# Patient Record
Sex: MALE | Race: WHITE | NOT HISPANIC OR LATINO | Employment: FULL TIME | RURAL
[De-identification: names, ages, dates, MRNs, and addresses within clinical notes are randomized per-mention and may not be internally consistent; named-entity substitution may affect disease eponyms.]

---

## 2019-12-04 ENCOUNTER — TRANSCRIBE ORDERS (OUTPATIENT)
Dept: PHYSICAL THERAPY | Facility: CLINIC | Age: 59
End: 2019-12-04

## 2019-12-04 DIAGNOSIS — M25.559 ARTHRALGIA OF HIP, UNSPECIFIED LATERALITY: ICD-10-CM

## 2019-12-04 DIAGNOSIS — Z96.642 STATUS POST TOTAL REPLACEMENT OF LEFT HIP: Primary | ICD-10-CM

## 2019-12-10 ENCOUNTER — TREATMENT (OUTPATIENT)
Dept: PHYSICAL THERAPY | Facility: CLINIC | Age: 59
End: 2019-12-10

## 2019-12-10 DIAGNOSIS — M25.552 PAIN OF LEFT HIP JOINT: ICD-10-CM

## 2019-12-10 DIAGNOSIS — Z96.642 PRESENCE OF LEFT ARTIFICIAL HIP JOINT: Primary | ICD-10-CM

## 2019-12-10 PROCEDURE — 97161 PT EVAL LOW COMPLEX 20 MIN: CPT | Performed by: PHYSICAL THERAPIST

## 2019-12-10 PROCEDURE — 97140 MANUAL THERAPY 1/> REGIONS: CPT | Performed by: PHYSICAL THERAPIST

## 2019-12-10 PROCEDURE — 97110 THERAPEUTIC EXERCISES: CPT | Performed by: PHYSICAL THERAPIST

## 2019-12-10 NOTE — PROGRESS NOTES
Physical Therapy Initial Evaluation and Plan of Care    Patient: John Montes   : 1960  Diagnosis/ICD-10 Code:  Presence of left artificial hip joint [Z96.642]  Referring practitioner: Jared Jama MD  Date of Initial Visit: 12/10/2019  Today's Date: 12/10/2019  Patient seen for 1 sessions           Subjective Questionnaire: [place] hip 12% deficit  Left THR 4 weeks ago today, home health with [facility] 3 weeks.  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  Mild lateral hip discomfort with prolonged activity.  Overall very pleased      Subjective Evaluation    History of Present Illness  Date of surgery: 11/10/2019      Patient Occupation: EMT Quality of life: good    Pain  Current pain ratin  Quality: dull ache and discomfort  Relieving factors: rest  Aggravating factors: ambulation, squatting, stairs and movement  Progression: improved    Treatments  Previous treatment: home therapy  Discharged from (in last 30 days): home health care  Patient Goals  Patient goals for therapy: decreased pain, improved balance, increased motion, increased strength, return to work and return to sport/leisure activities             Objective       Active Range of Motion     Additional Active Range of Motion Details  Hip flexion to 95 degrees, abduction active to be assessed.  Hip extension 10 degrees past neutral in sidelying.      Strength/Myotome Testing     Additional Strength Details  All hip strength left generally 4/5, more deconditioned than weakness, needs cues to avoid + Trendelenburg with gait.  Can perform figure 4 cross over without pain    Ambulation   Weight-Bearing Status     Additional Weight-Bearing Status Details  FWB with fair push off, mild lateral pelvic drop with gait improved with verbal cues.  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  Patient will benefit from PT for use of modalities as needed, active and passive stretching and strength endurance work including functional training for preparation for return to work.  Treatment will include HEP  Prognosis: good  Functional Limitations: carrying objects, lifting, walking, pulling, pushing, standing and unable to perform repetitive tasks  Goals  Plan Goals: Initiate ROM and strength program in 1 week    Independent HEP for self management by discharge    IADL/work  to prior level of function by discharge    [place] hip Subjective questionnaire will be to 10% or less by discharge      Plan  Therapy options: will be seen for skilled physical therapy services  Planned modality interventions: thermotherapy (hydrocollator packs)  Planned therapy interventions: ADL retraining, joint mobilization, manual therapy, strengthening and stretching  Treatment plan discussed with: patient  Plan details: 2-3X/week X 4 weeks to reach above goals        Timed:         Manual Therapy:  10    mins  54446;     Therapeutic Exercise:    30     mins  24690;     Neuromuscular Ghislaine:    0    mins  49843;    Therapeutic Activity:     0     mins  65883;     Gait Trainin     mins  79274;     Ultrasound:     0     mins  40882;    Ionto                               0    mins   93632    Un-Timed:  Electrical Stimulation:    0     mins  85853 (MC );  Dry Needling     0     mins self-pay  Traction     0     mins 44194  Low Eval     15     Mins  35198  Mod Eval     0     Mins  37527  High Eval                       0     Mins  67772        Timed Treatment:   40   mins   Total Treatment:     55    mins    PT SIGNATURE: Adela Seaman, PT   DATE TREATMENT INITIATED: 12/10/2019    Initial Certification  Certification Period: 3/9/2020  I certify that the therapy services are furnished while this patient is under my care.  The services outlined above are required by this patient, and will be reviewed every 90 days.     PHYSICIAN: Jared Jama MD      DATE:     Please sign and return via fax to [phone].. Thank you, [facility].

## 2019-12-12 ENCOUNTER — TREATMENT (OUTPATIENT)
Dept: PHYSICAL THERAPY | Facility: CLINIC | Age: 59
End: 2019-12-12

## 2019-12-12 DIAGNOSIS — Z96.642 PRESENCE OF LEFT ARTIFICIAL HIP JOINT: Primary | ICD-10-CM

## 2019-12-12 DIAGNOSIS — M25.552 PAIN OF LEFT HIP JOINT: ICD-10-CM

## 2019-12-12 PROCEDURE — 97110 THERAPEUTIC EXERCISES: CPT | Performed by: PHYSICAL THERAPIST

## 2019-12-12 PROCEDURE — 97140 MANUAL THERAPY 1/> REGIONS: CPT | Performed by: PHYSICAL THERAPIST

## 2019-12-12 NOTE — PROGRESS NOTES
Physical Therapy Daily Progress Note      Patient: John Montes   : 1960  Diagnosis/ICD-10 Code:  Presence of left artificial hip joint [Z96.642]  Referring practitioner: Jared Jama MD  Date of Initial Visit: Type: THERAPY  Noted: 12/10/2019  Today's Date: 2019  Patient seen for 2 sessions         John Montes reports: he has no pain today stating he has been able to do about anything he wants but still struggles to kneel in his shop or get low when working on his car.    Objective   See Exercise, Manual, and Modality Logs for complete treatment.     Assessment/Plan   Pt. Tolerates treatment well today with no pain with exercise or stretch. Pt. Continues to improve ambulation and walks with less antalgic gait this visit.    Progress per Plan of Care           Timed:         Manual Therapy:    8     mins  23598;     Therapeutic Exercise:    22     mins  14155;     Neuromuscular Ghislaine:        mins  20843;    Therapeutic Activity:          mins  61067;     Gait Training:           mins  92263;     Ultrasound:          mins  23714;    Ionto                                   mins   01306  Self Care                            mins   64317  Canalith Repos                   mins  4209    Un-Timed:  Electrical Stimulation:         mins  10014 ( );  Dry Needling          mins self-pay  Traction          mins 06802  Low Eval          Mins  33510  Mod Eval          Mins  54732  High Eval                            Mins  05114    Timed Treatment:   30   mins   Total Treatment:     30   mins    Belem De Leon PTA  Physical Therapist Assistant License #[id number]

## 2019-12-16 ENCOUNTER — TREATMENT (OUTPATIENT)
Dept: PHYSICAL THERAPY | Facility: CLINIC | Age: 59
End: 2019-12-16

## 2019-12-16 DIAGNOSIS — Z96.642 PRESENCE OF LEFT ARTIFICIAL HIP JOINT: Primary | ICD-10-CM

## 2019-12-16 DIAGNOSIS — M25.552 PAIN OF LEFT HIP JOINT: ICD-10-CM

## 2019-12-16 PROCEDURE — 97110 THERAPEUTIC EXERCISES: CPT | Performed by: PHYSICAL THERAPIST

## 2019-12-16 PROCEDURE — 97140 MANUAL THERAPY 1/> REGIONS: CPT | Performed by: PHYSICAL THERAPIST

## 2019-12-16 NOTE — PROGRESS NOTES
Physical Therapy Daily Progress Note        Patient: John Montes   : 1960  Diagnosis/ICD-10 Code:  Presence of left artificial hip joint [Z96.642]  Referring practitioner: Jared Jama MD  Date of Initial Visit: Type: THERAPY  Noted: 12/10/2019  Today's Date: 2019  Patient seen for 3 sessions         John Montes reports: trying to be more active at home in preparation for RTW, some stiffness but pleased with progress      Subjective     Objective   See Exercise, Manual, and Modality Logs for complete treatment.   Added deep squat motion on leg press and kettle bell lift    Trial HP and stretch prior to PT ex      Assessment/Plan    Better gait, less sidesway           Timed:         Manual Therapy:    15     mins  51889;     Therapeutic Exercise:    30     mins  80599;     Neuromuscular Ghislaine:    0    mins  84417;    Therapeutic Activity:     0     mins  06049;     Gait Trainin     mins  29311;     Ultrasound:     0     mins  40756;    Ionto                               0    mins   89630  Self Care                       0     mins   04434  Canalith Repos               0    mins  4209    Un-Timed:  Electrical Stimulation:    0     mins  50795 ( );  Dry Needling     0     mins self-pay  Traction     0     mins 91253  Low Eval     0     Mins  06115  Mod Eval     0     Mins  77343  High Eval                       0     Mins  51463    Timed Treatment:   45   mins   Total Treatment:     45   mins    Adela Seaman PT  Physical Therapist  [place] license [id number]

## 2019-12-18 ENCOUNTER — TREATMENT (OUTPATIENT)
Dept: PHYSICAL THERAPY | Facility: CLINIC | Age: 59
End: 2019-12-18

## 2019-12-18 DIAGNOSIS — M25.552 PAIN OF LEFT HIP JOINT: ICD-10-CM

## 2019-12-18 DIAGNOSIS — Z96.642 PRESENCE OF LEFT ARTIFICIAL HIP JOINT: Primary | ICD-10-CM

## 2019-12-18 PROCEDURE — 97110 THERAPEUTIC EXERCISES: CPT | Performed by: PHYSICAL THERAPIST

## 2019-12-18 PROCEDURE — 97140 MANUAL THERAPY 1/> REGIONS: CPT | Performed by: PHYSICAL THERAPIST

## 2019-12-18 NOTE — PROGRESS NOTES
Physical Therapy Daily Progress Note        Patient: John Montes   : 1960  Diagnosis/ICD-10 Code:  Presence of left artificial hip joint [Z96.642]  Referring practitioner: Jared Jama MD  Date of Initial Visit: Type: THERAPY  Noted: 12/10/2019  Today's Date: 2019  Patient seen for 4 sessions         John Montes reports: no new complaints, OK with increasing ex levels      Subjective     Objective   See Exercise, Manual, and Modality Logs for complete treatment.   Added lift box for squat lift requirement, sidestep TM on incline for hip abd strength, seated elliptical reverse      Assessment/Plan    Gaining mobility and strength, better gait           Timed:         Manual Therapy:    15     mins  03114;     Therapeutic Exercise:    30     mins  13009;     Neuromuscular Ghislaine:    0    mins  14534;    Therapeutic Activity:     0     mins  03465;     Gait Trainin     mins  21972;     Ultrasound:     0     mins  69070;    Ionto                               0    mins   95706  Self Care                       0     mins   83432  Canalith Repos               0    mins  4209    Un-Timed:  Electrical Stimulation:    0     mins  65504 ( );  Dry Needling     0     mins self-pay  Traction     0     mins 60451  Low Eval     0     Mins  87115  Mod Eval     0     Mins  59559  High Eval                       0     Mins  99966    Timed Treatment:   45   mins   Total Treatment:     45   mins    Adela Seaman PT  Physical Therapist  [place] license [id number]

## 2019-12-20 ENCOUNTER — TREATMENT (OUTPATIENT)
Dept: PHYSICAL THERAPY | Facility: CLINIC | Age: 59
End: 2019-12-20

## 2019-12-20 DIAGNOSIS — M25.552 PAIN OF LEFT HIP JOINT: ICD-10-CM

## 2019-12-20 DIAGNOSIS — Z96.642 PRESENCE OF LEFT ARTIFICIAL HIP JOINT: Primary | ICD-10-CM

## 2019-12-20 PROCEDURE — 97110 THERAPEUTIC EXERCISES: CPT | Performed by: PHYSICAL THERAPIST

## 2019-12-20 PROCEDURE — 97140 MANUAL THERAPY 1/> REGIONS: CPT | Performed by: PHYSICAL THERAPIST

## 2019-12-20 NOTE — PROGRESS NOTES
Physical Therapy Daily Progress Note      Patient: John Montes   : 1960  Diagnosis/ICD-10 Code:  Presence of left artificial hip joint [Z96.642]  Referring practitioner: Jared Jama MD  Date of Initial Visit: Type: THERAPY  Noted: 12/10/2019  Today's Date: 2019  Patient seen for 5 sessions         John Montes reports: he is having secondary issue of low back pain today stating he tweaked his back when working on his car last night. Pt. States the back pain is only present when he is walking. Pt. States the hip has still been doing good.    Objective   See Exercise, Manual, and Modality Logs for complete treatment.     Assessment/Plan   Pt. Tolerates treatment well this visit reporting no pain with exercise and reports relief of some of his back pain with exercise today. Pt. Continues to improve with therapy show decreased hip drop and greater LE strength. Pt. Is tolerating deeper squat for lifiting exercises at this time.    Progress per Plan of Care           Timed:         Manual Therapy:    8     mins  21456;     Therapeutic Exercise:    22     mins  80654;     Neuromuscular Ghislaine:        mins  95231;    Therapeutic Activity:          mins  80570;     Gait Training:           mins  30327;     Ultrasound:          mins  20662;    Ionto                                   mins   92283  Self Care                            mins   41217  Canalith Repos                   mins  4209    Un-Timed:  Electrical Stimulation:         mins  06852 ( );  Dry Needling          mins self-pay  Traction          mins 78983  Low Eval          Mins  96616  Mod Eval          Mins  46609  High Eval                            Mins  16641    Timed Treatment:   30   mins   Total Treatment:     40   mins    Belem De Leon PTA  Physical Therapist Assistant License #[id number]

## 2019-12-23 ENCOUNTER — TREATMENT (OUTPATIENT)
Dept: PHYSICAL THERAPY | Facility: CLINIC | Age: 59
End: 2019-12-23

## 2019-12-23 DIAGNOSIS — Z96.642 PRESENCE OF LEFT ARTIFICIAL HIP JOINT: Primary | ICD-10-CM

## 2019-12-23 DIAGNOSIS — M25.552 PAIN OF LEFT HIP JOINT: ICD-10-CM

## 2019-12-23 PROCEDURE — 97110 THERAPEUTIC EXERCISES: CPT | Performed by: PHYSICAL THERAPIST

## 2019-12-23 PROCEDURE — 97140 MANUAL THERAPY 1/> REGIONS: CPT | Performed by: PHYSICAL THERAPIST

## 2019-12-23 NOTE — PROGRESS NOTES
Physical Therapy Daily Progress Note        Patient: John Montes   : 1960  Diagnosis/ICD-10 Code:  Presence of left artificial hip joint [Z96.642]  Referring practitioner: Jared Jama MD  Date of Initial Visit: Type: THERAPY  Noted: 12/10/2019  Today's Date: 2019  Patient seen for 6 sessions         John Montes reports: no new reports, pleased with increasing mobility      Subjective     Objective   See Exercise, Manual, and Modality Logs for complete treatment.   Increased squat motion, trial of lift in left shoe, stands in slight side shift to left and ? Leg length in long sit, patient given precautions to avoid any pain with trial      Assessment/Plan    Making excellent gains with ROM and strength           Timed:         Manual Therapy:    15     mins  09456;     Therapeutic Exercise:    30     mins  51691;     Neuromuscular Ghislaine:    0    mins  41166;    Therapeutic Activity:     0     mins  14943;     Gait Trainin     mins  68067;     Ultrasound:     0     mins  92258;    Ionto                               0    mins   07263  Self Care                       0     mins   22947  Canalith Repos               0    mins  4209    Un-Timed:  Electrical Stimulation:    0     mins  92988 ( );  Dry Needling     0     mins self-pay  Traction     0     mins 05198  Low Eval     0     Mins  77608  Mod Eval     0     Mins  62766  High Eval                       0     Mins  88931    Timed Treatment:   45   mins   Total Treatment:     45   mins    Adela Seaman PT  Physical Therapist  [place] license [id number]

## 2019-12-27 ENCOUNTER — TREATMENT (OUTPATIENT)
Dept: PHYSICAL THERAPY | Facility: CLINIC | Age: 59
End: 2019-12-27

## 2019-12-27 DIAGNOSIS — M25.552 PAIN OF LEFT HIP JOINT: ICD-10-CM

## 2019-12-27 DIAGNOSIS — Z96.642 PRESENCE OF LEFT ARTIFICIAL HIP JOINT: Primary | ICD-10-CM

## 2019-12-27 PROCEDURE — 97140 MANUAL THERAPY 1/> REGIONS: CPT | Performed by: PHYSICAL THERAPIST

## 2019-12-27 PROCEDURE — 97110 THERAPEUTIC EXERCISES: CPT | Performed by: PHYSICAL THERAPIST

## 2019-12-27 NOTE — PROGRESS NOTES
Physical Therapy Daily Progress Note      Patient: John Montes   : 1960  Diagnosis/ICD-10 Code:  Presence of left artificial hip joint [Z96.642]  Referring practitioner: Jared Jama MD  Date of Initial Visit: Type: THERAPY  Noted: 12/10/2019  Today's Date: 2019  Patient seen for 7 sessions         John Montes reports: he has still been feeling good and has actually been better than he has been the entire time the last few days.    Objective   See Exercise, Manual, and Modality Logs for complete treatment.     Assessment/Plan  Pt. Continues to improve tolerance to activity and shows greater depth into squat without pain or tightness. Pt. Has improved ability to walk without antalgic gait and without compensation.    Progress per Plan of Care           Timed:         Manual Therapy:    8     mins  45841;     Therapeutic Exercise:    22     mins  17801;     Neuromuscular Ghislaine:        mins  50022;    Therapeutic Activity:          mins  78855;     Gait Training:           mins  15287;     Ultrasound:          mins  46978;    Ionto                                   mins   44437  Self Care                            mins   13092  Canalith Repos                   mins  4209    Un-Timed:  Electrical Stimulation:         mins  70101 ( );  Dry Needling          mins self-pay  Traction          mins 99699  Low Eval          Mins  67024  Mod Eval          Mins  65207  High Eval                            Mins  62514    Timed Treatment:   30   mins   Total Treatment:     40   mins    Belem De Leon PTA  Physical Therapist Assistant License #[id number]

## 2019-12-30 ENCOUNTER — TREATMENT (OUTPATIENT)
Dept: PHYSICAL THERAPY | Facility: CLINIC | Age: 59
End: 2019-12-30

## 2019-12-30 DIAGNOSIS — M25.552 PAIN OF LEFT HIP JOINT: ICD-10-CM

## 2019-12-30 DIAGNOSIS — Z96.642 PRESENCE OF LEFT ARTIFICIAL HIP JOINT: Primary | ICD-10-CM

## 2019-12-30 PROCEDURE — 97110 THERAPEUTIC EXERCISES: CPT | Performed by: PHYSICAL THERAPIST

## 2019-12-30 PROCEDURE — 97140 MANUAL THERAPY 1/> REGIONS: CPT | Performed by: PHYSICAL THERAPIST

## 2019-12-30 NOTE — PROGRESS NOTES
Physical Therapy Daily Progress Note        Patient: John Montes   : 1960  Diagnosis/ICD-10 Code:  Presence of left artificial hip joint [Z96.642]  Referring practitioner: Jared Jama MD  Date of Initial Visit: Type: THERAPY  Noted: 12/10/2019  Today's Date: 2019  Patient seen for 8 sessions         John Montes reports: did a volunteer run and was able to lift a guerney, pleased with progress      Subjective     Objective   See Exercise, Manual, and Modality Logs for complete treatment.   Completes all flow chart without any issues      Assessment/Plan    Approaching goals           Timed:         Manual Therapy:    15     mins  23835;     Therapeutic Exercise:    25     mins  68006;     Neuromuscular Ghislaine:    0    mins  63365;    Therapeutic Activity:     0     mins  67443;     Gait Trainin     mins  77110;     Ultrasound:     0     mins  92292;    Ionto                               0    mins   80118  Self Care                       0     mins   13046  Canalith Repos               0    mins  4209    Un-Timed:  Electrical Stimulation:    0     mins  18332 ( );  Dry Needling     0     mins self-pay  Traction     0     mins 33436  Low Eval     0     Mins  75364  Mod Eval     0     Mins  14049  High Eval                       0     Mins  68691    Timed Treatment:   40   mins   Total Treatment:     40   mins    Adela Seaman PT  Physical Therapist  [place] license [id number]

## 2020-01-08 ENCOUNTER — TREATMENT (OUTPATIENT)
Dept: PHYSICAL THERAPY | Facility: CLINIC | Age: 60
End: 2020-01-08

## 2020-01-08 DIAGNOSIS — M25.552 PAIN OF LEFT HIP JOINT: ICD-10-CM

## 2020-01-08 DIAGNOSIS — Z96.642 PRESENCE OF LEFT ARTIFICIAL HIP JOINT: Primary | ICD-10-CM

## 2020-01-08 PROCEDURE — 97110 THERAPEUTIC EXERCISES: CPT | Performed by: PHYSICAL THERAPIST

## 2020-01-08 NOTE — PROGRESS NOTES
Physical Therapy Daily Progress Note/ DC summary        Patient: John Montes   : 1960  Diagnosis/ICD-10 Code:  Presence of left artificial hip joint [Z96.642]  Referring practitioner: Jared Jama MD  Date of Initial Visit: Type: THERAPY  Noted: 12/10/2019  Today's Date: 2020  Patient seen for 9 sessions         John Montes reports: no issues, no pain, feels he is ready to return to job      Subjective     Objective   See Exercise, Manual, and Modality Logs for complete treatment.   Resisted testing all planes WNL and ROM WNL, able to deep squat as needed for lifting for work duties      Assessment/Plan    All goals met as per plan of care, patient indep with HEP, ready for DC to [facility], advised to call if any issues           Timed:         Manual Therapy:    5     mins  48812;     Therapeutic Exercise:    30     mins  91495;     Neuromuscular Ghislaine:    0    mins  63283;    Therapeutic Activity:     0     mins  56057;     Gait Trainin     mins  31109;     Ultrasound:     0     mins  62713;    Ionto                               0    mins   46464  Self Care                       0     mins   76766  Canalith Repos               0    mins  4209    Un-Timed:  Electrical Stimulation:    0     mins  80029 ( );  Dry Needling     0     mins self-pay  Traction     0     mins 77833  Low Eval     0     Mins  39312  Mod Eval     0     Mins  05021  High Eval                       0     Mins  02406    Timed Treatment:   35   mins   Total Treatment:     35   mins    Adela Seaman PT  Physical Therapist  [place] license [id number]

## 2021-08-30 ENCOUNTER — TRANSCRIBE ORDERS (OUTPATIENT)
Dept: ADMINISTRATIVE | Facility: HOSPITAL | Age: 61
End: 2021-08-30

## 2021-08-30 DIAGNOSIS — M25.512 LEFT SHOULDER PAIN, UNSPECIFIED CHRONICITY: Primary | ICD-10-CM

## 2021-09-22 ENCOUNTER — HOSPITAL ENCOUNTER (OUTPATIENT)
Dept: MRI IMAGING | Facility: HOSPITAL | Age: 61
Discharge: HOME | End: 2021-09-22
Admitting: ORTHOPAEDIC SURGERY

## 2021-09-22 DIAGNOSIS — M25.512 LEFT SHOULDER PAIN, UNSPECIFIED CHRONICITY: ICD-10-CM

## 2021-09-22 PROCEDURE — 73221 MRI JOINT UPR EXTREM W/O DYE: CPT

## 2021-10-19 ENCOUNTER — HOSPITAL ENCOUNTER (OUTPATIENT)
Dept: CARDIOLOGY | Facility: HOSPITAL | Age: 61
Discharge: HOME | End: 2021-10-19

## 2021-10-19 ENCOUNTER — LAB (OUTPATIENT)
Dept: LAB | Facility: HOSPITAL | Age: 61
End: 2021-10-19

## 2021-10-19 ENCOUNTER — TRANSCRIBE ORDERS (OUTPATIENT)
Dept: ADMINISTRATIVE | Facility: HOSPITAL | Age: 61
End: 2021-10-19

## 2021-10-19 DIAGNOSIS — Z01.818 PRE-OP TESTING: Primary | ICD-10-CM

## 2021-10-19 DIAGNOSIS — Z01.818 PRE-OP TESTING: ICD-10-CM

## 2021-10-19 LAB
ALBUMIN SERPL-MCNC: 4.1 G/DL (ref 3.5–5.2)
ALBUMIN/GLOB SERPL: 1.5 G/DL
ALP SERPL-CCNC: 92 U/L (ref 39–117)
ALT SERPL W P-5'-P-CCNC: 30 U/L (ref 1–41)
ANION GAP SERPL CALCULATED.3IONS-SCNC: 9.3 MMOL/L (ref 5–15)
AST SERPL-CCNC: 22 U/L (ref 1–40)
BASOPHILS # BLD AUTO: 0.06 10*3/MM3 (ref 0–0.2)
BASOPHILS NFR BLD AUTO: 0.8 % (ref 0–1.5)
BILIRUB SERPL-MCNC: 0.7 MG/DL (ref 0–1.2)
BUN SERPL-MCNC: 27 MG/DL (ref 8–23)
BUN/CREAT SERPL: 34.6 (ref 7–25)
CALCIUM SPEC-SCNC: 9.4 MG/DL (ref 8.6–10.5)
CHLORIDE SERPL-SCNC: 106 MMOL/L (ref 98–107)
CO2 SERPL-SCNC: 24.7 MMOL/L (ref 22–29)
CREAT SERPL-MCNC: 0.78 MG/DL (ref 0.76–1.27)
DEPRECATED RDW RBC AUTO: 42.5 FL (ref 37–54)
EOSINOPHIL # BLD AUTO: 0.13 10*3/MM3 (ref 0–0.4)
EOSINOPHIL NFR BLD AUTO: 1.8 % (ref 0.3–6.2)
ERYTHROCYTE [DISTWIDTH] IN BLOOD BY AUTOMATED COUNT: 12.8 % (ref 12.3–15.4)
GFR SERPL CREATININE-BSD FRML MDRD: 101 ML/MIN/1.73
GLOBULIN UR ELPH-MCNC: 2.8 GM/DL
GLUCOSE SERPL-MCNC: 155 MG/DL (ref 65–99)
HCT VFR BLD AUTO: 47.2 % (ref 37.5–51)
HGB BLD-MCNC: 15.6 G/DL (ref 13–17.7)
IMM GRANULOCYTES # BLD AUTO: 0.03 10*3/MM3 (ref 0–0.05)
IMM GRANULOCYTES NFR BLD AUTO: 0.4 % (ref 0–0.5)
LYMPHOCYTES # BLD AUTO: 1.49 10*3/MM3 (ref 0.7–3.1)
LYMPHOCYTES NFR BLD AUTO: 20.3 % (ref 19.6–45.3)
MCH RBC QN AUTO: 29.8 PG (ref 26.6–33)
MCHC RBC AUTO-ENTMCNC: 33.1 G/DL (ref 31.5–35.7)
MCV RBC AUTO: 90.2 FL (ref 79–97)
MONOCYTES # BLD AUTO: 0.64 10*3/MM3 (ref 0.1–0.9)
MONOCYTES NFR BLD AUTO: 8.7 % (ref 5–12)
NEUTROPHILS NFR BLD AUTO: 4.98 10*3/MM3 (ref 1.7–7)
NEUTROPHILS NFR BLD AUTO: 68 % (ref 42.7–76)
NRBC BLD AUTO-RTO: 0 /100 WBC (ref 0–0.2)
PLATELET # BLD AUTO: 224 10*3/MM3 (ref 140–450)
PMV BLD AUTO: 10.1 FL (ref 6–12)
POTASSIUM SERPL-SCNC: 4.1 MMOL/L (ref 3.5–5.2)
PROT SERPL-MCNC: 6.9 G/DL (ref 6–8.5)
RBC # BLD AUTO: 5.23 10*6/MM3 (ref 4.14–5.8)
SODIUM SERPL-SCNC: 140 MMOL/L (ref 136–145)
WBC # BLD AUTO: 7.33 10*3/MM3 (ref 3.4–10.8)

## 2021-10-19 PROCEDURE — 93005 ELECTROCARDIOGRAM TRACING: CPT | Performed by: ORTHOPAEDIC SURGERY

## 2021-10-19 PROCEDURE — 80053 COMPREHEN METABOLIC PANEL: CPT

## 2021-10-19 PROCEDURE — 36415 COLL VENOUS BLD VENIPUNCTURE: CPT

## 2021-10-19 PROCEDURE — 85025 COMPLETE CBC W/AUTO DIFF WBC: CPT

## 2021-10-19 PROCEDURE — 93010 ELECTROCARDIOGRAM REPORT: CPT | Performed by: INTERNAL MEDICINE

## 2021-10-20 LAB — QT INTERVAL: 409 MS

## 2021-11-15 ENCOUNTER — TREATMENT (OUTPATIENT)
Dept: PHYSICAL THERAPY | Facility: CLINIC | Age: 61
End: 2021-11-15

## 2021-11-15 DIAGNOSIS — Z98.890 HISTORY OF ARTHROSCOPY OF LEFT SHOULDER: ICD-10-CM

## 2021-11-15 DIAGNOSIS — R29.898 SHOULDER WEAKNESS: Primary | ICD-10-CM

## 2021-11-15 PROCEDURE — 97161 PT EVAL LOW COMPLEX 20 MIN: CPT | Performed by: PHYSICAL THERAPIST

## 2021-11-15 PROCEDURE — 97140 MANUAL THERAPY 1/> REGIONS: CPT | Performed by: PHYSICAL THERAPIST

## 2021-11-15 PROCEDURE — 97110 THERAPEUTIC EXERCISES: CPT | Performed by: PHYSICAL THERAPIST

## 2021-11-15 NOTE — PROGRESS NOTES
Physical Therapy Initial Evaluation and Plan of Care    Patient: John Montes   : 1960  Diagnosis/ICD-10 Code:  Shoulder weakness [R29.898]  Referring practitioner: SAMIR Hughes  Date of Initial Visit: 11/15/2021  Today's Date: 11/15/2021  Patient seen for 1 sessions             Subjective Evaluation    History of Present Illness  Mechanism of injury: Patient is a 61 y.o. WM who presents s/p L shoulder scope on 10/26/21.  He dislikes sling and doesn't know how long MD wants him to keep wearing it.  He did not bring a protocol with him so we have requested that from MD. He reports his rotator cuff was repaired, removed a bone spur and cleaned it up.  No significant pain (1/10), just stiffness.  He is sleeping in bed, mostly bothered by sling. Personal goals:  get out of sling, get back to work at [email or web address].    Patient Goals  Patient goals for therapy: increased motion, increased strength and return to work             Objective QuickDASH indicates 70.5% impairment with limitations in most activities.  AAROM L shoulder flexion = 120 deg, abd to 90 deg, IR to belly, ER to 20 deg.  Not wearing sling today. MMT deferred due to surgical precautions.      Assessment & Plan     Assessment  Impairments: abnormal muscle tone, abnormal or restricted ROM, activity intolerance and lacks appropriate home exercise program  Functional Limitations: carrying objects, lifting, reaching behind back and reaching overhead  Goals  Plan Goals: Patient independent with HEP in 2 weeks  Flexion to 140 deg in 2 weeks  Able to return to driving in 2 weeks  Able to ambulate independently with good arm swing in 2 weeks  Improve function as evidenced by QuickDASH score of 20% or less by discharge (70.5% impairment initially)  Able to return to work by discharge  Regain L shoulder strength 4+/5 by discharge       Plan  Therapy options: will be seen for skilled physical therapy services  Other planned modality  interventions: physical modalities as needed  Planned therapy interventions: manual therapy, functional ROM exercises, flexibility, gait training, home exercise program, strengthening, stretching and therapeutic activities  Frequency: 2x week  Duration in weeks: 12  Treatment plan discussed with: patient  Plan details: Plan to continue PT up to 12 weeks if needed.        Timed:         Manual Therapy:    15     mins  24434;     Therapeutic Exercise:    15     mins  54850;     Neuromuscular Ghislaine:        mins  15729;    Therapeutic Activity:          mins  36261;     Gait Training:           mins  31722;     Ultrasound:          mins  56524;    Ionto                                   mins   01867  Self-care  ____ mins 70330    Un-Timed:  Electrical Stimulation:         mins  16278 ( );  Dry Needling          mins self-pay  Traction          mins 30324  Low Eval     15     Mins  93717  Mod Eval          Mins  72646  High Eval                            Mins  06641  Canal repositioning _____ mins  79651        Timed Treatment:   30   mins   Total Treatment:     45   mins    PT SIGNATURE: Robin A Sprigler, PT   DATE TREATMENT INITIATED: 11/15/2021    Initial Certification  Certification Period: 2/13/2022  I certify that the therapy services are furnished while this patient is under my care.  The services outlined above are required by this patient, and will be reviewed every 90 days.     PHYSICIAN: Tamika Freedman FNP ____________________________________________________________________________________________________        DATE: ________________________________________________________________________________________________________________________________    Please sign and return via fax to [phone].. Thank you, [facility].

## 2021-11-17 ENCOUNTER — TREATMENT (OUTPATIENT)
Dept: PHYSICAL THERAPY | Facility: CLINIC | Age: 61
End: 2021-11-17

## 2021-11-17 DIAGNOSIS — R29.898 SHOULDER WEAKNESS: Primary | ICD-10-CM

## 2021-11-17 DIAGNOSIS — Z98.890 HISTORY OF ARTHROSCOPY OF LEFT SHOULDER: ICD-10-CM

## 2021-11-17 PROCEDURE — 97140 MANUAL THERAPY 1/> REGIONS: CPT | Performed by: PHYSICAL THERAPIST

## 2021-11-17 PROCEDURE — 97110 THERAPEUTIC EXERCISES: CPT | Performed by: PHYSICAL THERAPIST

## 2021-11-17 NOTE — PROGRESS NOTES
Physical Therapy Daily Progress Note    Patient: John Montes   : 1960  Diagnosis/ICD-10 Code:  Shoulder weakness [R29.898]  Referring practitioner: SAMIR Hughes  Date of Initial Visit: Type: THERAPY  Noted: 11/15/2021  Today's Date: 2021  Patient seen for 2 sessions             Subjective Patient reports he was adjusting his steering wheel with his L hand and feels like it strained L upper arm.      Objective   See Exercise, Manual, and Modality Logs for complete treatment. Added upper row and lat pull today for scapular stabilization.  No significant tenderness or deformity noted in L upper arm.  Unable to relax for AAROM.  Able to flex actively to 80 deg standing.  Added chest press with overhead flexion today.      Assessment/Plan  Patient independent with HEP in 2 weeks - NOT MET  Flexion to 140 deg in 2 weeks - NOT MET  Able to return to driving in 2 weeks - NOT MET  Able to ambulate independently with good arm swing in 2 weeks - NOT MET  Improve function as evidenced by QuickDASH score of 20% or less by discharge (70.5% impairment initially) - NOT MET  Able to return to work by discharge - NOT MET  Regain L shoulder strength 4+/5 by discharge  - NOT MET    Progress per Plan of Care up to 12 weeks expiring 22           Timed:         Manual Therapy:    15     mins  44022;     Therapeutic Exercise:    15     mins  83737;     Neuromuscular Ghislaine:        mins  43542;    Therapeutic Activity:          mins  49863;     Gait Training:           mins  36807;     Ultrasound:          mins  81207;    Ionto                                   mins   14739  Self Care                            mins   09443      Un-Timed:  Electrical Stimulation:         mins  02828 ( );  Dry Needling          mins self-pay  Traction          mins 22185  Low Eval          Mins  48348  Mod Eval          Mins  71660  High Eval                            Mins  45853  [facility]                    mins  89376    Timed Treatment:   30   mins   Total Treatment:     30   mins    Robin A Sprigler, PT  Physical Therapist

## 2021-11-22 ENCOUNTER — TREATMENT (OUTPATIENT)
Dept: PHYSICAL THERAPY | Facility: CLINIC | Age: 61
End: 2021-11-22

## 2021-11-22 DIAGNOSIS — Z98.890 HISTORY OF ARTHROSCOPY OF LEFT SHOULDER: Primary | ICD-10-CM

## 2021-11-22 PROCEDURE — 97140 MANUAL THERAPY 1/> REGIONS: CPT | Performed by: PHYSICAL THERAPIST

## 2021-11-22 PROCEDURE — 97110 THERAPEUTIC EXERCISES: CPT | Performed by: PHYSICAL THERAPIST

## 2021-11-22 NOTE — PROGRESS NOTES
Physical Therapy Daily Progress Note      Patient: John Montes   : 1960  Diagnosis/ICD-10 Code:  History of arthroscopy of left shoulder [Z98.890]  Referring practitioner: SAMIR Hughes  Date of Initial Visit: 2021  Today's Date: 2021  Patient seen for 3 sessions.  POC 2x/wk x 12 weeks, exp. 22    S/p L shoulder arthroscopy with rotator cuff repair and spur removal 10/26/21-4 weeks post    DATE 10/26/2021 2021 2021 2021 2021 2021 2021 2021 2021 2021   POST OP WEEK 0 1 2 3 4 5 6 7 8 9   DATE 2022 2022 2022 2022 2022 2022 2/15/2022 2022 3/1/2022 3/8/2022   POST OP WEEK 10 11 12 13 14 15 16 17 18 19   DATE 3/15/2022 3/22/2022 3/29/2022 2022 2022 2022 2022 5/3/2022 5/10/2022 2022   POST OP WEEK 20 21 22 23 24 25 26 27 28 29         VISIT#: 3        Subjective :  Pt. Pain 2/10 L shoulder and biceps.  He reports that bicep has been bothering him since he lifted something with it.  He is not wearing sling as states it is a pain.          Objective     See Exercise, Manual, and Modality Logs for complete treatment. Progression as noted.     Patient Education:  Reason for sling and strongly emphasized using it.     Assessment/Plan: Concern that patient is non-compliant with sling and possibly injured L  Biceps.  Pt. Also not compliant with surgical precautions.  He has a lot of difficulty relaxing for PROM/AAROM.       Progress per Plan of Care:  Continue to reinforce use of sling and following surgical precautions. Monitor bicep pain.             Timed:         Manual Therapy:   10      mins  16415;     Therapeutic Exercise:     20    mins  67866;     Neuromuscular Ghislaine:        mins  40933;    Therapeutic Activity:          mins  00682;     Gait Training:           mins  47371;     Ultrasound:          mins  98594;    Ionto                                   mins   85838  Self Care                             mins   97975  Aquatic                               mins 10046    Un-Timed:  Electrical Stimulation:         mins  45885 ( );  Traction          mins 10901      Timed Treatment:  30    mins   Total Treatment:     30   mins    Kelsey Jeffery PTA  Physical Therapist Assistant [place] license:  #[id number]

## 2021-11-24 ENCOUNTER — TREATMENT (OUTPATIENT)
Dept: PHYSICAL THERAPY | Facility: CLINIC | Age: 61
End: 2021-11-24

## 2021-11-24 DIAGNOSIS — Z98.890 HISTORY OF ARTHROSCOPY OF LEFT SHOULDER: Primary | ICD-10-CM

## 2021-11-24 PROCEDURE — 97110 THERAPEUTIC EXERCISES: CPT | Performed by: PHYSICAL THERAPIST

## 2021-11-24 PROCEDURE — 97140 MANUAL THERAPY 1/> REGIONS: CPT | Performed by: PHYSICAL THERAPIST

## 2021-11-24 NOTE — PROGRESS NOTES
Physical Therapy Daily Progress Note      Patient: John Montes   : 1960  Diagnosis/ICD-10 Code:  History of arthroscopy of left shoulder [Z98.890]  Referring practitioner: SAMIR Hughes. Follow up 21  Date of Initial Visit: 2021  Today's Date: 2021  Patient seen for 4 sessions.  POC 2x/wk x 12 weeks, exp. 22    S/p L shoulder arthroscopy with rotator cuff repair and spur removal 10/26/21-4 weeks post    DATE 10/26/2021 2021 2021 2021 2021 2021 2021 2021 2021 2021   POST OP WEEK 0 1 2 3 4 5 6 7 8 9   DATE 2022 2022 2022 2022 2022 2022 2/15/2022 2022 3 3   POST OP WEEK 10 11 12 13 14 15 16 17 18 19   DATE 3/15/2022 3/22/2022 3/29/2022 2022 2022 2022 2022 5/3/2022 5/10/2022 2022   POST OP WEEK 20 21 22 23 24 25 26 27 28 29         VISIT#: 4        Subjective :  Minimal pain L deltoid, 2/10.  Pt. Brought sling in saying that he has been wearing it.         Objective     See Exercise, Manual, and Modality Logs for complete treatment. Progression as noted.    Patient Education:  HEP progressed and issued.  See chart.     Exercises  Standing Backward Shoulder Rolls - 2 x daily - 7 x weekly - 1 sets - 10 reps  Standing Scapular Retraction - 2 x daily - 7 x weekly - 1 sets - 10 reps - 5 sec. hold  Standing Shoulder Abduction AAROM with Dowel - 2 x daily - 7 x weekly - 1 sets - 10 reps  Supine Shoulder Flexion with Dowel - 2 x daily - 7 x weekly - 1 sets - 10 reps  Standing Shoulder External Rotation AAROM with Dowel - 1 x daily - 7 x weekly - 1 sets - 10 reps      Assessment/Plan: L shoulder complex tight with patient having difficulty relaxing and  scapula from shoulder. He was able to progress without issue.  He appears to be more compliant and wife continues to urge him to keep sling on.  L bicep less painful but still bulges with motion.        Progress per Plan of Care:              Timed:         Manual Therapy:   10      mins  44861;     Therapeutic Exercise:     20    mins  75445;     Neuromuscular Ghislaine:        mins  25244;    Therapeutic Activity:          mins  56594;     Gait Training:           mins  94731;     Ultrasound:          mins  75044;    Ionto                                   mins   34916  Self Care                            mins   70185  Aquatic                               mins 79910    Un-Timed:  Electrical Stimulation:         mins  97866 ( );  Traction          mins 97651      Timed Treatment:  30    mins   Total Treatment:     30   mins    Kelsey Jeffery PTA  Physical Therapist Assistant [place] license:  #[id number]

## 2021-11-29 ENCOUNTER — TREATMENT (OUTPATIENT)
Dept: PHYSICAL THERAPY | Facility: CLINIC | Age: 61
End: 2021-11-29

## 2021-11-29 DIAGNOSIS — Z98.890 HISTORY OF ARTHROSCOPY OF LEFT SHOULDER: Primary | ICD-10-CM

## 2021-11-29 PROCEDURE — 97140 MANUAL THERAPY 1/> REGIONS: CPT | Performed by: PHYSICAL THERAPIST

## 2021-11-29 PROCEDURE — 97110 THERAPEUTIC EXERCISES: CPT | Performed by: PHYSICAL THERAPIST

## 2021-11-29 NOTE — PROGRESS NOTES
Physical Therapy Daily Progress Note      Patient: John Montes   : 1960  Diagnosis/ICD-10 Code:  History of arthroscopy of left shoulder [Z98.890]  Referring practitioner: SAMIR Hughes. Follow up 21  Date of Initial Visit: 2021  Today's Date: 2021  Patient seen for 5 sessions.  POC 2x/wk x 12 weeks, exp. 22    S/p L shoulder arthroscopy with rotator cuff repair and spur removal 10/26/21-4 weeks post    DATE 10/26/2021 2021 2021 2021 2021 2021 2021 2021 2021 2021   POST OP WEEK 0 1 2 3 4 5 6 7 8 9   DATE 2022 2022 2022 2022 2022 2022 2/15/2022 2022 3/1/2022 3/8/2022   POST OP WEEK 10 11 12 13 14 15 16 17 18 19   DATE 3/15/2022 3/22/2022 3/29/2022 2022 2022 2022 2022 5/3/2022 5/10/2022 2022   POST OP WEEK 20 21 22 23 24 25 26 27 28 29         VISIT#: 5        Subjective :  Doing progressed HEP without issue.  Pain 2/10 L biceps.  Pt. Is convinced that he tore L biceps.         Objective     See Exercise, Manual, and Modality Logs for complete treatment.    Patient Education:  Pt. Strongly urged to call orthopedic doctor and follow up regarding L biceps.       Assessment/Plan: Pt. Most likely tore L bicep as evidenced by bulge mid bicep and pain.  He needs to follow up with orthopedic doctor. L shoulder not affected by this and progressing well.     Progress per Plan of Care:              Timed:         Manual Therapy:   10      mins  33446;     Therapeutic Exercise:     20    mins  27982;     Neuromuscular Ghislaine:        mins  14781;    Therapeutic Activity:          mins  78407;     Gait Training:           mins  44045;     Ultrasound:          mins  97306;    Ionto                                   mins   35415  Self Care                            mins   79643  Aquatic                               mins 06583    Un-Timed:  Electrical Stimulation:         mins  96422  ( );  Traction          mins 61001      Timed Treatment:  30    mins   Total Treatment:     30   mins    Kelsey Jeffery PTA  Physical Therapist Assistant [place] license:  #[id number]

## 2021-12-06 ENCOUNTER — TREATMENT (OUTPATIENT)
Dept: PHYSICAL THERAPY | Facility: CLINIC | Age: 61
End: 2021-12-06

## 2021-12-06 DIAGNOSIS — R29.898 SHOULDER WEAKNESS: ICD-10-CM

## 2021-12-06 DIAGNOSIS — Z98.890 HISTORY OF ARTHROSCOPY OF LEFT SHOULDER: Primary | ICD-10-CM

## 2021-12-06 PROCEDURE — 97140 MANUAL THERAPY 1/> REGIONS: CPT | Performed by: PHYSICAL THERAPIST

## 2021-12-06 PROCEDURE — 97110 THERAPEUTIC EXERCISES: CPT | Performed by: PHYSICAL THERAPIST

## 2021-12-06 NOTE — PROGRESS NOTES
Physical Therapy Daily Progress Note    Patient: John Montes   : 1960  Diagnosis/ICD-10 Code:  History of arthroscopy of left shoulder [Z98.890]  Referring practitioner: SAMIR Hughes  Date of Initial Visit: Type: THERAPY  Noted: 11/15/2021  Today's Date: 2021  Patient seen for 6 sessions             Subjective Patient reports he went back to MD who confirmed bicep tear and told him he can fix it or leave it.  He's decided to leave it for now.    Objective   See Exercise, Manual, and Modality Logs for complete treatment. Difficulty relaxing with passive stretch and manual therapy, does better with AAROM.  Added standing L arm pull down using pulley focusing on scapular retraction and depression.  Shoulder flexion to 140 deg standing.      Assessment/Plan  Patient independent with HEP in 2 weeks - MET  Flexion to 140 deg in 2 weeks - MET  Able to return to driving in 2 weeks - MET  Able to ambulate independently with good arm swing in 2 weeks - MET  Improve function as evidenced by QuickDASH score of 20% or less by discharge (70.5% impairment initially) - NOT MET  Able to return to work by discharge - NOT MET  Regain L shoulder strength 4+/5 by discharge  - NOT MET    Progress per Plan of Care up to 12 weeks expiring 22           Timed:         Manual Therapy:    10     mins  96160;     Therapeutic Exercise:    20     mins  24551;     Neuromuscular Ghislaine:        mins  54964;    Therapeutic Activity:          mins  57679;     Gait Training:           mins  25758;     Ultrasound:          mins  18250;    Ionto                                   mins   73492  Self Care                            mins   36636      Un-Timed:  Electrical Stimulation:         mins  29365 ( );  Dry Needling          mins self-pay  Traction          mins 16150  Low Eval          Mins  23710  Mod Eval          Mins  70160  High Eval                            Mins  18288  [facility] Repos                    mins  28507    Timed Treatment:   30   mins   Total Treatment:     30   mins    Robin A Sprigler, PT  Physical Therapist

## 2021-12-09 ENCOUNTER — TREATMENT (OUTPATIENT)
Dept: PHYSICAL THERAPY | Facility: CLINIC | Age: 61
End: 2021-12-09

## 2021-12-09 DIAGNOSIS — R29.898 SHOULDER WEAKNESS: ICD-10-CM

## 2021-12-09 DIAGNOSIS — Z98.890 HISTORY OF ARTHROSCOPY OF LEFT SHOULDER: Primary | ICD-10-CM

## 2021-12-09 PROCEDURE — 97140 MANUAL THERAPY 1/> REGIONS: CPT | Performed by: PHYSICAL THERAPIST

## 2021-12-09 PROCEDURE — 97110 THERAPEUTIC EXERCISES: CPT | Performed by: PHYSICAL THERAPIST

## 2021-12-09 NOTE — PROGRESS NOTES
"     Physical Therapy Daily Progress Note    Patient: John Montes   : 1960  Diagnosis/ICD-10 Code:  History of arthroscopy of left shoulder [Z98.890]  Referring practitioner: SAMIR Hughes  Date of Initial Visit: Type: THERAPY  Noted: 11/15/2021  Today's Date: 2021  Patient seen for 7 sessions             Subjective Patient reports no new complaints today.  \"I'm ready to go back to work\".    Objective   See Exercise, Manual, and Modality Logs for complete treatment. QuickDASH next visit.Plan to add 1# to scaption next visit.      Assessment/Plan  Patient independent with HEP in 2 weeks - MET  Flexion to 140 deg in 2 weeks - MET  Able to return to driving in 2 weeks - MET  Able to ambulate independently with good arm swing in 2 weeks - MET  Improve function as evidenced by QuickDASH score of 20% or less by discharge (70.5% impairment initially) - NOT MET  Able to return to work by discharge - NOT MET  Regain L shoulder strength 4+/5 by discharge  - NOT MET     Progress per Plan of Care up to 12 weeks expiring 22               Timed:         Manual Therapy:    10     mins  29370;     Therapeutic Exercise:    20     mins  21667;     Neuromuscular Ghislaine:        mins  26751;    Therapeutic Activity:          mins  60782;     Gait Training:           mins  37828;     Ultrasound:          mins  12120;    Ionto                                   mins   36585  Self Care                            mins   72340      Un-Timed:  Electrical Stimulation:         mins  90803 (MC );  Dry Needling          mins self-pay  Traction          mins 25246  Low Eval          Mins  26058  Mod Eval          Mins  24672  High Eval                            Mins  12108  Canalith Repos                   mins  43601    Timed Treatment:   30   mins   Total Treatment:     30   mins    Robin A Sprigler, PT  Physical Therapist    "

## 2021-12-13 ENCOUNTER — TREATMENT (OUTPATIENT)
Dept: PHYSICAL THERAPY | Facility: CLINIC | Age: 61
End: 2021-12-13

## 2021-12-13 DIAGNOSIS — R29.898 SHOULDER WEAKNESS: ICD-10-CM

## 2021-12-13 DIAGNOSIS — Z98.890 HISTORY OF ARTHROSCOPY OF LEFT SHOULDER: Primary | ICD-10-CM

## 2021-12-13 PROCEDURE — 97110 THERAPEUTIC EXERCISES: CPT | Performed by: PHYSICAL THERAPIST

## 2021-12-13 PROCEDURE — 97140 MANUAL THERAPY 1/> REGIONS: CPT | Performed by: PHYSICAL THERAPIST

## 2021-12-13 NOTE — PROGRESS NOTES
Physical Therapy Daily Progress Note    Patient: John Montes   : 1960  Diagnosis/ICD-10 Code:  History of arthroscopy of left shoulder [Z98.890]  Referring practitioner: SAMIR Hughes  Date of Initial Visit: Type: THERAPY  Noted: 11/15/2021  Today's Date: 2021  Patient seen for 8 sessions             Subjective Mr. Montes has completed 8 sessions of PT with excellent attendance and compliance.  He is anxious to return to work and believes he can do this safely.    Objective   See Exercise, Manual, and Modality Logs for complete treatment. Patient does better with active versus passive exercise, has difficulty relaxing.  AROM L shoulder: flexion/scaption = 135 active (150 passive using contract/relax technique), IR= 75 deg (able to reach belt compared to midback on R), ER= 90 deg (opposite ear overhead).  QuickDASH indicates 18% impairment with limitations in recreation, work, lifting.  ROM progressing well.  Added 1# to scaption without difficulty.  Added IR with extension using dowel tona.      Assessment/Plan  Patient independent with HEP in 2 weeks - MET  Flexion to 140 deg in 2 weeks - MET  Able to return to driving in 2 weeks - MET  Able to ambulate independently with good arm swing in 2 weeks - MET  Improve function as evidenced by QuickDASH score of 20% or less by discharge (70.5% impairment initially) -  MET (currently 18%)  Able to return to work by discharge - NOT MET  Regain L shoulder strength 4+/5 by discharge  - NOT MET    Progress per Plan of Care up to 12 weeks expiring 22           Timed:         Manual Therapy:    10     mins  61029;     Therapeutic Exercise:    20     mins  21261;     Neuromuscular Ghislaine:        mins  99480;    Therapeutic Activity:          mins  39759;     Gait Training:           mins  22791;     Ultrasound:          mins  06085;    Ionto                                   mins   08576  Self Care                            mins    24241      Un-Timed:  Electrical Stimulation:         mins  15872 ( );  Dry Needling          mins self-pay  Traction          mins 17142  Low Eval          Mins  44622  Mod Eval          Mins  80579  High Eval                            Mins  34292  Canalith Repos                   mins  57400    Timed Treatment:   30   mins   Total Treatment:     30   mins    Robin A Sprigler, PT  Physical Therapist

## 2021-12-15 ENCOUNTER — TREATMENT (OUTPATIENT)
Dept: PHYSICAL THERAPY | Facility: CLINIC | Age: 61
End: 2021-12-15

## 2021-12-15 DIAGNOSIS — R29.898 SHOULDER WEAKNESS: ICD-10-CM

## 2021-12-15 DIAGNOSIS — Z98.890 HISTORY OF ARTHROSCOPY OF LEFT SHOULDER: Primary | ICD-10-CM

## 2021-12-15 PROCEDURE — 97140 MANUAL THERAPY 1/> REGIONS: CPT | Performed by: PHYSICAL THERAPIST

## 2021-12-15 PROCEDURE — 97110 THERAPEUTIC EXERCISES: CPT | Performed by: PHYSICAL THERAPIST

## 2021-12-15 PROCEDURE — 97112 NEUROMUSCULAR REEDUCATION: CPT | Performed by: PHYSICAL THERAPIST

## 2021-12-15 NOTE — PROGRESS NOTES
Physical Therapy Daily Progress Note      Patient: John Montes   : 1960  Diagnosis/ICD-10 Code:  History of arthroscopy of left shoulder [Z98.890]  Referring practitioner: SAMIR Hughes. Follow up 21  Date of Initial Visit: 12/15/2021  Today's Date: 12/15/2021  Patient seen for 9 sessions.  POC 2x/wk x 12 weeks, exp. 22    S/p L shoulder arthroscopy with rotator cuff repair and spur removal 10/26/21-7 weeks post    DATE 10/26/2021 2021 2021 2021 2021 2021 2021 2021 2021 2021   POST OP WEEK 0 1 2 3 4 5 6 7 8 9   DATE 2022 2022 2022 2022 2022 2022 2/15/2022 2022 3 3   POST OP WEEK 10 11 12 13 14 15 16 17 18 19   DATE 3/15/2022 3/22/2022 3/29/2022 2022 2022 2022 2022 5/3/2022 5/10/2022 2022   POST OP WEEK 20 21 22 23 24 25 26 27 28 29         VISIT#: 9        Subjective : Some aching in L shoulder, 2/10.  He returns to surgeon on 22 and will find out then if he can be released to return to work.          Objective     See Exercise, Manual, and Modality Logs for complete treatment.    Patient Education:  Exercises slow and with control.       Assessment/Plan: Pt. Requires frequent cueing both verbal and tactile to perform there ex with control, correct mechanics.      Progress per Plan of Care:  Consider adding TB for rows, low rows, IR, ER            Timed:         Manual Therapy:   10      mins  07404;     Therapeutic Exercise:     20    mins  46337;     Neuromuscular Ghislaine:  10      mins  77642;    Therapeutic Activity:          mins  18279;     Gait Training:           mins  64509;     Ultrasound:          mins  21537;    Ionto                                   mins   85616  Self Care                            mins   64934  Aquatic                               mins 31390    Un-Timed:  Electrical Stimulation:         mins  20506 ( );  Traction           mins 73682      Timed Treatment:  40    mins   Total Treatment:     40   mins    Kelsey Jeffery PTA  Physical Therapist Assistant [place] license:  #[id number]

## 2021-12-20 ENCOUNTER — TREATMENT (OUTPATIENT)
Dept: PHYSICAL THERAPY | Facility: CLINIC | Age: 61
End: 2021-12-20

## 2021-12-20 DIAGNOSIS — R29.898 SHOULDER WEAKNESS: ICD-10-CM

## 2021-12-20 DIAGNOSIS — Z98.890 HISTORY OF ARTHROSCOPY OF LEFT SHOULDER: Primary | ICD-10-CM

## 2021-12-20 PROCEDURE — 97112 NEUROMUSCULAR REEDUCATION: CPT | Performed by: PHYSICAL THERAPIST

## 2021-12-20 PROCEDURE — 97110 THERAPEUTIC EXERCISES: CPT | Performed by: PHYSICAL THERAPIST

## 2021-12-20 NOTE — PROGRESS NOTES
Physical Therapy Daily Progress Note    Patient: John Montes   : 1960  Diagnosis/ICD-10 Code:  History of arthroscopy of left shoulder [Z98.890]  Referring practitioner: SAMIR Hughes  Date of Initial Visit: Type: THERAPY  Noted: 11/15/2021  Today's Date: 2021  Patient seen for 10 sessions             Subjective Patient reports his shoulder feels stiff today.  MD did not release him to return to work yet but he goes back on the  and hopes he will release him then.      Objective   See Exercise, Manual, and Modality Logs for complete treatment. Added IR/ER pulley today.  Assess response to treatment next visit and add TB IR/ER for HEP if no problems.      Assessment/Plan  Patient independent with HEP in 2 weeks - MET  Flexion to 140 deg in 2 weeks - MET  Able to return to driving in 2 weeks - MET  Able to ambulate independently with good arm swing in 2 weeks - MET  Improve function as evidenced by QuickDASH score of 20% or less by discharge (70.5% impairment initially) -  MET (currently 18%)  Able to return to work by discharge - NOT MET  Regain L shoulder strength 4+/5 by discharge  - NOT MET    Progress per Plan of Care expiring 22           Timed:         Manual Therapy:         mins  19010;     Therapeutic Exercise:    15     mins  23599;     Neuromuscular Ghislaine:    15    mins  55861;    Therapeutic Activity:          mins  56558;     Gait Training:           mins  00286;     Ultrasound:          mins  31110;    Ionto                                   mins   47252  Self Care                            mins   15447      Un-Timed:  Electrical Stimulation:         mins  54850 ( );  Dry Needling          mins self-pay  Traction          mins 27378  Low Eval          Mins  92604  Mod Eval          Mins  42101  High Eval                            Mins  10525  Canalith Repos                   mins  04843    Timed Treatment:   30   mins   Total Treatment:     30    mins    Robin A Sprigler, PT  Physical Therapist

## 2021-12-22 ENCOUNTER — TREATMENT (OUTPATIENT)
Dept: PHYSICAL THERAPY | Facility: CLINIC | Age: 61
End: 2021-12-22

## 2021-12-22 DIAGNOSIS — R29.898 SHOULDER WEAKNESS: ICD-10-CM

## 2021-12-22 DIAGNOSIS — Z98.890 HISTORY OF ARTHROSCOPY OF LEFT SHOULDER: Primary | ICD-10-CM

## 2021-12-22 PROCEDURE — 97140 MANUAL THERAPY 1/> REGIONS: CPT | Performed by: PHYSICAL THERAPIST

## 2021-12-22 PROCEDURE — 97112 NEUROMUSCULAR REEDUCATION: CPT | Performed by: PHYSICAL THERAPIST

## 2021-12-22 PROCEDURE — 97110 THERAPEUTIC EXERCISES: CPT | Performed by: PHYSICAL THERAPIST

## 2021-12-22 NOTE — PROGRESS NOTES
Physical Therapy Daily Progress Note      Patient: John Montes   : 1960  Diagnosis/ICD-10 Code:  History of arthroscopy of left shoulder [Z98.890]  Referring practitioner: SAMIR Hughes. Follow up 21  Date of Initial Visit: 2021  Today's Date: 2021  Patient seen for 11 sessions.  POC 2x/wk x 12 weeks, exp. 22    S/p L shoulder arthroscopy with rotator cuff repair and spur removal 10/26/21-8 weeks post    DATE 10/26/2021 2021 2021 2021 2021 2021 2021 2021 2021 2021   POST OP WEEK 0 1 2 3 4 5 6 7 8 9   DATE 2022 2022 2022 2022 2022 2022 2/15/2022 2022 3 3   POST OP WEEK 10 11 12 13 14 15 16 17 18 19   DATE 3/15/2022 3/22/2022 3/29/2022 2022 2022 2022 2022 5/3/2022 5/10/2022 2022   POST OP WEEK 20 21 22 23 24 25 26 27 28 29         VISIT#: 11        Subjective : Some aching in L lateral shoulder, 2/10.  He continues to note improvement.          Objective     See Exercise, Manual, and Modality Logs for complete treatment.  Progression as noted.     Patient Education:  Exercises slow and with control.   HEP progressed and issued. See chart.     Exercises  Standing Bilateral Low Shoulder Row with Anchored Resistance - 1 x daily - 4 x weekly - 2 sets - 10 reps  Shoulder Extension with Resistance - 1 x daily - 4 x weekly - 2 sets - 10 reps  Shoulder Internal Rotation with Resistance - 1 x daily - 4 x weekly - 2 sets - 10 reps  Shoulder External Rotation with Anchored Resistance - 1 x daily - 4 x weekly - 2 sets - 10 reps      Assessment/Plan: Pt. Requires frequent cueing both verbal and tactile to perform there ex with control, correct mechanics.  He is anxious to return to work.  Goals met as noted.     Patient independent with HEP in 2 weeks - MET  Flexion to 140 deg in 2 weeks - MET  Able to return to driving in 2 weeks - MET  Able to ambulate independently  with good arm swing in 2 weeks - MET  Improve function as evidenced by QuickDASH score of 20% or less by discharge (70.5% impairment initially) -  MET (currently 18%)  Able to return to work by discharge - NOT MET  Regain L shoulder strength 4+/5 by discharge  - NOT MET    Progress per Plan of Care:              Timed:         Manual Therapy:   10      mins  36926;     Therapeutic Exercise:     10    mins  06714;     Neuromuscular Ghislaine:  10      mins  77835;    Therapeutic Activity:          mins  49874;     Gait Training:           mins  44454;     Ultrasound:          mins  83261;    Ionto                                   mins   78661  Self Care                            mins   44361  Aquatic                               mins 37070    Un-Timed:  Electrical Stimulation:         mins  14534 ( );  Traction          mins 44205      Timed Treatment:  30    mins   Total Treatment:     30   mins    Kelsey Jeffery PTA  Physical Therapist Assistant [place] license:  #[id number]

## 2022-01-03 ENCOUNTER — TREATMENT (OUTPATIENT)
Dept: PHYSICAL THERAPY | Facility: CLINIC | Age: 62
End: 2022-01-03

## 2022-01-03 DIAGNOSIS — Z98.890 HISTORY OF ARTHROSCOPY OF LEFT SHOULDER: Primary | ICD-10-CM

## 2022-01-03 PROCEDURE — 97110 THERAPEUTIC EXERCISES: CPT | Performed by: PHYSICAL THERAPIST

## 2022-01-03 PROCEDURE — 97112 NEUROMUSCULAR REEDUCATION: CPT | Performed by: PHYSICAL THERAPIST

## 2022-01-03 PROCEDURE — 97140 MANUAL THERAPY 1/> REGIONS: CPT | Performed by: PHYSICAL THERAPIST

## 2022-01-03 NOTE — PROGRESS NOTES
Physical Therapy Daily Progress Note      Patient: John Montes   : 1960  Diagnosis/ICD-10 Code:  History of arthroscopy of left shoulder [Z98.890]  Referring practitioner: SAMIR Hughes. Follow up 21  Date of Initial Visit: 1/3/2022  Today's Date: 1/3/2022  Patient seen for 12 sessions.  POC 2x/wk x 12 weeks, exp. 22    S/p L shoulder arthroscopy with rotator cuff repair and spur removal 10/26/21-9 weeks post    DATE 10/26/2021 2021 2021 2021 2021 2021 2021 2021 2021 2021   POST OP WEEK 0 1 2 3 4 5 6 7 8 9   DATE 2022 2022 2022 2022 2022 2022 2/15/2022 2022 3/1/2022 3/8/2022   POST OP WEEK 10 11 12 13 14 15 16 17 18 19   DATE 3/15/2022 3/22/2022 3/29/2022 2022 2022 2022 2022 5/3/2022 5/10/2022 2022   POST OP WEEK 20 21 22 23 24 25 26 27 28 29         VISIT#: 12        Subjective : No new complaints. He remains anxious to return to work.          Objective     See Exercise, Manual, and Modality Logs for complete treatment.   Progression as noted.     Patient Education:  Reviewed HEP and progressed to green theraband.           Assessment/Plan: Pt. With noted fatigue L UE after progression to heavier weights and/or more sets.     Patient independent with HEP in 2 weeks - MET  Flexion to 140 deg in 2 weeks - MET  Able to return to driving in 2 weeks - MET  Able to ambulate independently with good arm swing in 2 weeks - MET  Improve function as evidenced by QuickDASH score of 20% or less by discharge (70.5% impairment initially) -  MET (currently 18%)  Able to return to work by discharge - NOT MET  Regain L shoulder strength 4+/5 by discharge  - NOT MET    Progress per Plan of Care:              Timed:         Manual Therapy:   10      mins  97246;     Therapeutic Exercise:     25    mins  02344;     Neuromuscular Ghislaine:  10      mins  06536;    Therapeutic Activity:          mins   13624;     Gait Training:           mins  08855;     Ultrasound:          mins  64408;    Ionto                                   mins   44648  Self Care                            mins   04379  Aquatic                               mins 56218    Un-Timed:  Electrical Stimulation:         mins  76100 ( );  Traction          mins 65947      Timed Treatment:  45    mins   Total Treatment:     45   mins    Kelsey Jeffery PTA  Physical Therapist Assistant [place] license:  #[id number]

## 2022-01-05 ENCOUNTER — TREATMENT (OUTPATIENT)
Dept: PHYSICAL THERAPY | Facility: CLINIC | Age: 62
End: 2022-01-05

## 2022-01-05 DIAGNOSIS — Z98.890 HISTORY OF ARTHROSCOPY OF LEFT SHOULDER: Primary | ICD-10-CM

## 2022-01-05 DIAGNOSIS — R29.898 SHOULDER WEAKNESS: ICD-10-CM

## 2022-01-05 PROCEDURE — 97112 NEUROMUSCULAR REEDUCATION: CPT | Performed by: PHYSICAL THERAPIST

## 2022-01-05 PROCEDURE — 97140 MANUAL THERAPY 1/> REGIONS: CPT | Performed by: PHYSICAL THERAPIST

## 2022-01-05 PROCEDURE — 97110 THERAPEUTIC EXERCISES: CPT | Performed by: PHYSICAL THERAPIST

## 2022-01-05 NOTE — PROGRESS NOTES
Physical Therapy Daily Progress Note      Patient: John Montes   : 1960  Diagnosis/ICD-10 Code:  History of arthroscopy of left shoulder [Z98.890]  Referring practitioner: No ref. provider found. Follow up 21  Date of Initial Visit: 2022  Today's Date: 2022  Patient seen for 13 sessions.  POC 2x/wk x 12 weeks, exp. 22    S/p L shoulder arthroscopy with rotator cuff repair and spur removal 10/26/21-10 weeks post    DATE 10/26/2021 2021 2021 2021 2021 2021 2021 2021 2021 2021   POST OP WEEK 0 1 2 3 4 5 6 7 8 9   DATE 2022 2022 2022 2022 2022 2022 2/15/2022 2022 3/1/2022 3/8/2022   POST OP WEEK 10 11 12 13 14 15 16 17 18 19   DATE 3/15/2022 3/22/2022 3/29/2022 2022 2022 2022 2022 5/3/2022 5/10/2022 2022   POST OP WEEK 20 21 22 23 24 25 26 27 28 29         VISIT#: 13        Subjective : Patient has minimal soreness, 2/10 L lateral upper arm.  His return to work date has been pushed back until 22.  He will see ortho on 22.          Objective     See Exercise, Manual, and Modality Logs for complete treatment.     Patient Education:        Assessment/Plan: Pt. Needs very little cueing for there ex now as mechanics improved as does very little shoulder hiking.     Patient independent with HEP in 2 weeks - MET  Flexion to 140 deg in 2 weeks - MET  Able to return to driving in 2 weeks - MET  Able to ambulate independently with good arm swing in 2 weeks - MET  Improve function as evidenced by QuickDASH score of 20% or less by discharge (70.5% impairment initially) -  MET (currently 18%)  Able to return to work by discharge - NOT MET  Regain L shoulder strength 4+/5 by discharge  - NOT MET    Progress per Plan of Care:              Timed:         Manual Therapy:   10      mins  62096;     Therapeutic Exercise:     25    mins  09820;     Neuromuscular Ghislaine:  10      mins  86841;     Therapeutic Activity:          mins  95725;     Gait Training:           mins  55142;     Ultrasound:          mins  99700;    Ionto                                   mins   17635  Self Care                            mins   03366  Aquatic                               mins 53850    Un-Timed:  Electrical Stimulation:         mins  25995 ( );  Traction          mins 38649      Timed Treatment:  45    mins   Total Treatment:     45   mins    Kelsey Jeffery PTA  Physical Therapist Assistant [place] license:  #[id number]

## 2022-01-12 ENCOUNTER — TREATMENT (OUTPATIENT)
Dept: PHYSICAL THERAPY | Facility: CLINIC | Age: 62
End: 2022-01-12

## 2022-01-12 DIAGNOSIS — Z98.890 HISTORY OF ARTHROSCOPY OF LEFT SHOULDER: Primary | ICD-10-CM

## 2022-01-12 PROCEDURE — 97110 THERAPEUTIC EXERCISES: CPT | Performed by: PHYSICAL THERAPIST

## 2022-01-12 PROCEDURE — 97112 NEUROMUSCULAR REEDUCATION: CPT | Performed by: PHYSICAL THERAPIST

## 2022-01-12 PROCEDURE — 97140 MANUAL THERAPY 1/> REGIONS: CPT | Performed by: PHYSICAL THERAPIST

## 2022-01-12 NOTE — PROGRESS NOTES
Physical Therapy Daily Progress Note      Patient: John Montes   : 1960  Diagnosis/ICD-10 Code:  History of arthroscopy of left shoulder [Z98.890]  Referring practitioner: SAMIR Hughes.   Date of Initial Visit: 2022  Today's Date: 2022  Patient seen for 14 sessions.  POC 2x/wk x 12 weeks, exp. 22    S/p L shoulder arthroscopy with rotator cuff repair and spur removal 10/26/21-11 weeks post    DATE 10/26/2021 2021 2021 2021 2021 2021 2021 2021 2021 2021   POST OP WEEK 0 1 2 3 4 5 6 7 8 9   DATE 2022 2022 2022 2022 2022 2022 2/15/2022 2022 3/1/2022 3/8/2022   POST OP WEEK 10 11 12 13 14 15 16 17 18 19   DATE 3/15/2022 3/22/2022 3/29/2022 2022 2022 2022 2022 5/3/2022 5/10/2022 2022   POST OP WEEK 20 21 22 23 24 25 26 27 28 29         VISIT#: 14        Subjective : No pain.  Pt. Saw ortho who released him to return to work on 22, no restrictions.           Objective     See Exercise, Manual, and Modality Logs for complete treatment. Progression as noted.     Patient Education:        Assessment/Plan:  Pt. Did well with all progression, mild fatigue noted after treatment.   Pt. Anxious to return to work and feels he is approaching discharge. Would like to see patient 1-2 visits after return to work to ensure he is doing well.     Patient independent with HEP in 2 weeks - MET  Flexion to 140 deg in 2 weeks - MET  Able to return to driving in 2 weeks - MET  Able to ambulate independently with good arm swing in 2 weeks - MET  Improve function as evidenced by QuickDASH score of 20% or less by discharge (70.5% impairment initially) -  MET (currently 18%)  Able to return to work by discharge - NOT MET  Regain L shoulder strength 4+/5 by discharge  - NOT MET    Progress per Plan of Care:  Continue x 1-2, discharge.             Timed:         Manual Therapy:   10      mins   56300;     Therapeutic Exercise:     25    mins  52549;     Neuromuscular Ghislaine:  10      mins  36593;    Therapeutic Activity:          mins  73517;     Gait Training:           mins  23826;     Ultrasound:          mins  35852;    Ionto                                   mins   95028  Self Care                            mins   40983  Aquatic                               mins 64706    Un-Timed:  Electrical Stimulation:         mins  13654 ( );  Traction          mins 16181      Timed Treatment:  45    mins   Total Treatment:     45   mins    Kelsey Jeffery PTA  Physical Therapist Assistant [place] license:  #[id number]

## 2022-01-20 ENCOUNTER — TREATMENT (OUTPATIENT)
Dept: PHYSICAL THERAPY | Facility: CLINIC | Age: 62
End: 2022-01-20

## 2022-01-20 DIAGNOSIS — R29.898 SHOULDER WEAKNESS: ICD-10-CM

## 2022-01-20 DIAGNOSIS — Z98.890 HISTORY OF ARTHROSCOPY OF LEFT SHOULDER: Primary | ICD-10-CM

## 2022-01-20 PROCEDURE — 97112 NEUROMUSCULAR REEDUCATION: CPT | Performed by: PHYSICAL THERAPIST

## 2022-01-20 PROCEDURE — 97110 THERAPEUTIC EXERCISES: CPT | Performed by: PHYSICAL THERAPIST

## 2022-01-20 NOTE — PROGRESS NOTES
Discharge Summary  Discharge Summary from Physical Therapy Report          Goals: All Met    Discharge Plan: Continue with current home exercise program as instructed    Comments All goals met.  Patient voices readiness for discharge.    Date of Discharge 22        Robin A Sprigler, PT  Physical Therapist             Physical Therapy Daily Progress Note    Patient: John Montes   : 1960  Diagnosis/ICD-10 Code:  History of arthroscopy of left shoulder [Z98.890]  Referring practitioner: SAMIR Hughes  Date of Initial Visit: Type: THERAPY  Noted: 11/15/2021  Today's Date: 2022  Patient seen for 15 sessions             Subjective Patient reports he went back to work Monday with no difficulty!  He reports no complaints today.  He voices readiness for discharge.    Objective   See Exercise, Manual, and Modality Logs for complete treatment. QuickDASH indicates only 2% impairment today.  All goals met.      Assessment/Plan  Patient independent with HEP in 2 weeks - MET  Flexion to 140 deg in 2 weeks - MET  Able to return to driving in 2 weeks - MET  Able to ambulate independently with good arm swing in 2 weeks - MET  Improve function as evidenced by QuickDASH score of 20% or less by discharge (70.5% impairment initially) -  MET (currently 2%)  Able to return to work by discharge - MET  Regain L shoulder strength 4+/5 by discharge  -  MET     Discharge to HEP and self-management.           Timed:         Manual Therapy:   10      mins  64333;     Therapeutic Exercise:    25     mins  44721;     Neuromuscular Ghislaine:    10    mins  99923;    Therapeutic Activity:          mins  00686;     Gait Training:           mins  04979;     Ultrasound:          mins  73670;    Ionto                                   mins   75394  Self Care                            mins   17341      Un-Timed:  Electrical Stimulation:         mins  59310 ( );  Dry Needling          mins self-pay  Traction           mins 26221  Low Eval          Mins  38666  Mod Eval          Mins  71724  High Eval                            Mins  75679  Canalith Repos                   mins  13518    Timed Treatment:   45   mins   Total Treatment:     45   mins    Robin A Sprigler, PT  Physical Therapist